# Patient Record
Sex: MALE | Race: WHITE | NOT HISPANIC OR LATINO | URBAN - METROPOLITAN AREA
[De-identification: names, ages, dates, MRNs, and addresses within clinical notes are randomized per-mention and may not be internally consistent; named-entity substitution may affect disease eponyms.]

---

## 2017-03-10 ENCOUNTER — IMPORTED ENCOUNTER (OUTPATIENT)
Dept: URBAN - METROPOLITAN AREA CLINIC 1 | Facility: CLINIC | Age: 57
End: 2017-03-10

## 2017-03-10 PROBLEM — Z79.4: Noted: 2017-03-10

## 2017-03-10 PROBLEM — E11.9: Noted: 2017-03-10

## 2017-03-10 PROBLEM — H25.813: Noted: 2017-03-10

## 2017-03-10 PROBLEM — E11.3293: Noted: 2017-03-10

## 2017-03-10 PROBLEM — H04.123: Noted: 2017-03-10

## 2017-03-10 PROCEDURE — 92015 DETERMINE REFRACTIVE STATE: CPT

## 2017-03-10 PROCEDURE — 92014 COMPRE OPH EXAM EST PT 1/>: CPT

## 2017-03-10 NOTE — PATIENT DISCUSSION
1.  DM Type II without sign of diabetic retinopathy and no blot heme on dilated retinal examination today OU No Macular Edema:  Discussed the pathophysiology of diabetes and its effect on the eye and risk of blindness. Stressed the importance of strong glucose control. Advised of importance of at least yearly dilated examinations but to contact us immediately for any problems or concerns. 2. Type II Insulin dependent diabetes mellitus3. Cataract OU: Observe for now without intervention. The patient was advised to contact us if any change or worsening of vision4. Dry Eyes OU -- Recommended to patient to use Artificial Tears BID OU5. Return for an appointment in 6 months for 10 DFE. with Dr. Marlene Villasenor.

## 2017-09-15 ENCOUNTER — IMPORTED ENCOUNTER (OUTPATIENT)
Dept: URBAN - METROPOLITAN AREA CLINIC 1 | Facility: CLINIC | Age: 57
End: 2017-09-15

## 2017-09-15 PROBLEM — Z79.4: Noted: 2017-09-15

## 2017-09-15 PROBLEM — E11.3293: Noted: 2017-09-15

## 2017-09-15 PROBLEM — H43.811: Noted: 2017-09-15

## 2017-09-15 PROBLEM — H25.813: Noted: 2017-09-15

## 2017-09-15 PROBLEM — H16.143: Noted: 2017-09-15

## 2017-09-15 PROBLEM — H04.123: Noted: 2017-09-15

## 2017-09-15 PROCEDURE — 92012 INTRM OPH EXAM EST PATIENT: CPT

## 2017-09-15 NOTE — PATIENT DISCUSSION
1.  DM Type II (on Insulin) with Mild Nonproliferative Diabetic Retinopathy OU No Macular Edema:  Discussed the pathophysiology of diabetes and its effect on the eye and risk of blindness. Stressed the importance of strong glucose control. Advised of importance of at least yearly dilated examinations but to contact us immediately for any problems or concerns. 2. Cataract OU: Observe for now without intervention. The patient was advised to contact us if any change or worsening of vision3. JOHNY w/ PEK OU- Cont ATs TID OU Routinely. 4.  PVD w/o Tear OD - RD precautions. Return for an appointment in 6 mo 30 with Dr. Kristian Calabrese.

## 2018-03-08 ENCOUNTER — IMPORTED ENCOUNTER (OUTPATIENT)
Dept: URBAN - METROPOLITAN AREA CLINIC 1 | Facility: CLINIC | Age: 58
End: 2018-03-08

## 2018-03-08 PROBLEM — H25.813: Noted: 2018-03-08

## 2018-03-08 PROBLEM — E11.3293: Noted: 2018-03-08

## 2018-03-08 PROBLEM — H04.123: Noted: 2018-03-08

## 2018-03-08 PROBLEM — H16.143: Noted: 2018-03-08

## 2018-03-08 PROBLEM — Z79.4: Noted: 2018-03-08

## 2018-03-08 PROBLEM — H43.811: Noted: 2018-03-08

## 2018-03-08 PROCEDURE — 92014 COMPRE OPH EXAM EST PT 1/>: CPT

## 2018-03-08 NOTE — PATIENT DISCUSSION
1.  DM Type II with Mild Nonproliferative Diabetic Retinopathy OU No Macular Edema: Stable OD. Slight progression OS. Discussed the pathophysiology of diabetes and its effect on the eye and risk of blindness. Stressed the importance of strong glucose control. Advised of importance of at least yearly dilated examinations but to contact us immediately for any problems or concerns. 2. Type II Insulin dependent diabetes mellitus3. Cataract OU: Observe for now without intervention. The patient was advised to contact us if any change or worsening of vision4. JOHNY w/ PEK OU- Controlled. The continuation of artificial tears were recommended. 5.  PVD w/o Tear OD- Old stable. 6.  Return for an appointment for a 40 in 6 months and a 27 in 1 year with Dr. Roxie Del Cid.

## 2018-09-11 ENCOUNTER — IMPORTED ENCOUNTER (OUTPATIENT)
Dept: URBAN - METROPOLITAN AREA CLINIC 1 | Facility: CLINIC | Age: 58
End: 2018-09-11

## 2018-09-11 PROBLEM — H52.13: Noted: 2018-09-11

## 2018-09-11 PROBLEM — H52.4: Noted: 2018-09-11

## 2018-09-11 PROCEDURE — S0621 ROUTINE OPHTHALMOLOGICAL EXA: HCPCS

## 2018-09-11 NOTE — PATIENT DISCUSSION
1. Myopia OU -- Finalized Glasses MRx was given to patient today for correction if indicated and requested2. Presbyopia OU 3.  DM Type II (Insulin Dependent) -- H/o with Mild Nonproliferative Diabetic Retinopathy OU but h/o no Macular Edema OU. 4. Cataracts OU -- Observe5. JOHNY w/ PEK OU -- Recommend the frequent use of OTC AT's BID-QID OU. 6. PVD w/o Tear OD Finalized CTL Rx & given to patient today. Return for an appointment in 1 YR for a 36 / CC OU with Dr. Mel Tee. Return as scheduled in March 2019 for 30 appointment with Dr. Mark Contreras.  Patient has been self treating w/ MonoVA: OD CTL- Distance VA / OS CTL (leaves out)- Intermediate / Near [place]

## 2019-03-08 ENCOUNTER — IMPORTED ENCOUNTER (OUTPATIENT)
Dept: URBAN - METROPOLITAN AREA CLINIC 1 | Facility: CLINIC | Age: 59
End: 2019-03-08

## 2019-03-08 PROBLEM — H04.123: Noted: 2019-03-08

## 2019-03-08 PROBLEM — H25.813: Noted: 2019-03-08

## 2019-03-08 PROBLEM — H43.811: Noted: 2019-03-08

## 2019-03-08 PROBLEM — E11.3293: Noted: 2019-03-08

## 2019-03-08 PROBLEM — Z79.4: Noted: 2019-03-08

## 2019-03-08 PROBLEM — H16.143: Noted: 2019-03-08

## 2019-03-08 PROCEDURE — 92014 COMPRE OPH EXAM EST PT 1/>: CPT

## 2019-03-08 NOTE — PATIENT DISCUSSION
1.  DM Type II (Insulin) with Mild Nonproliferative Diabetic Retinopathy OU No Macular Edema:  Discussed the pathophysiology of diabetes and its effect on the eye and risk of blindness. Stressed the importance of strong glucose control. Advised of importance of at least yearly dilated examinations but to contact us immediately for any problems or concerns. 2. JOHNY w/ PEK OU- Recommend ATs TID OU routinely 3. Cataract OU: Observe for now without intervention. The patient was advised to contact us if any change or worsening of vision4. PVD w/o Tear OD - RD precautions. 5.  H/o CTL Wear - Monovision Return for an appointment in 1 year 27 with Dr. Luan Hernandez. Return for an appointment for Return as scheduled 40/cc with Dr. Luan Hernandez.

## 2019-09-16 ENCOUNTER — IMPORTED ENCOUNTER (OUTPATIENT)
Dept: URBAN - METROPOLITAN AREA CLINIC 1 | Facility: CLINIC | Age: 59
End: 2019-09-16

## 2019-09-16 PROBLEM — H52.13: Noted: 2019-09-16

## 2019-09-16 PROCEDURE — S0621 ROUTINE OPHTHALMOLOGICAL EXA: HCPCS

## 2019-09-16 NOTE — PATIENT DISCUSSION
1. Myopia -- Rx was given for correction if indicated and requested. 2. Cataract OU -- Observe for now without intervention. The patient was advised to contact us if any change or worsening of vision3. JOHNY w/ PEK OU -- Continue ATs TID OU routinely. 4. CTL Wear -- Monovision Patient defers CTL exam today. 5. PVD w/o Tear OD. 6.  H/o DM Type II (Insulin) with Mild Nonproliferative Diabetic Retinopathy OU No Macular EdemaReturn for an appointment in 1 year 36 with Dr. Jyoti Garcia. Return as scheduled 30 with Dr. Skylar Fan.

## 2020-03-13 ENCOUNTER — IMPORTED ENCOUNTER (OUTPATIENT)
Dept: URBAN - METROPOLITAN AREA CLINIC 1 | Facility: CLINIC | Age: 60
End: 2020-03-13

## 2020-03-13 PROBLEM — H43.811: Noted: 2020-03-13

## 2020-03-13 PROBLEM — H16.143: Noted: 2020-03-13

## 2020-03-13 PROBLEM — H04.123: Noted: 2020-03-13

## 2020-03-13 PROBLEM — Z79.84: Noted: 2020-03-13

## 2020-03-13 PROBLEM — E11.9: Noted: 2020-03-13

## 2020-03-13 PROBLEM — H25.813: Noted: 2020-03-13

## 2020-03-13 PROBLEM — Z79.4: Noted: 2020-03-13

## 2020-03-13 PROCEDURE — 92014 COMPRE OPH EXAM EST PT 1/>: CPT

## 2020-03-13 PROCEDURE — 92015 DETERMINE REFRACTIVE STATE: CPT

## 2020-03-13 NOTE — PATIENT DISCUSSION
1.  DM Type II (Insulin) with Mild Nonproliferative Diabetic Retinopathy OU No Macular Edema:  Discussed the pathophysiology of diabetes and its effect on the eye and risk of blindness. Stressed the importance of strong glucose control. Advised of importance of at least yearly dilated examinations but to contact us immediately for any problems or concerns. 2. JOHNY w/ PEK OU- Recommend ATs TID OU routinely 3. Cataract OU - Observe for now without intervention. The patient was advised to contact us if any change or worsening of vision4. PVD w/o Tear OD - RD precautions. 5.  H/o CTL Wear - Monovision Deferred MRx today. Letter to PCP. Return for an appointment in 1 year 27 with Dr. Rome Dumont.

## 2020-09-18 ENCOUNTER — IMPORTED ENCOUNTER (OUTPATIENT)
Dept: URBAN - METROPOLITAN AREA CLINIC 1 | Facility: CLINIC | Age: 60
End: 2020-09-18

## 2020-09-18 PROBLEM — H52.4: Noted: 2020-09-18

## 2020-09-18 PROBLEM — H52.13: Noted: 2020-09-18

## 2020-09-18 PROCEDURE — S0621 ROUTINE OPHTHALMOLOGICAL EXA: HCPCS

## 2020-09-18 NOTE — PATIENT DISCUSSION
1. Myopia/ Presbyopia -- Rx was given for correction if indicated and requested. 2. Cataract OU -- Observe for now without intervention. The patient was advised to contact us if any change or worsening of vision3. JOHNY w/ PEK OU -- Continue ATs TID OU routinely. 4. CTL Wear -- Monovision Patient defers CTL exam today. 5. PVD w/o Tear OD. 6.  H/o DM Type II (Insulin) with Mild Nonproliferative Diabetic Retinopathy OU No Macular EdemaReturn for an appointment in 1 year 36 with Dr. Sandra Cardona.

## 2021-03-09 ENCOUNTER — IMPORTED ENCOUNTER (OUTPATIENT)
Dept: URBAN - METROPOLITAN AREA CLINIC 1 | Facility: CLINIC | Age: 61
End: 2021-03-09

## 2021-03-09 PROBLEM — H04.123: Noted: 2021-03-09

## 2021-03-09 PROBLEM — H25.813: Noted: 2021-03-09

## 2021-03-09 PROBLEM — H16.143: Noted: 2021-03-09

## 2021-03-09 PROBLEM — Z79.4: Noted: 2021-03-09

## 2021-03-09 PROBLEM — E11.3293: Noted: 2021-03-09

## 2021-03-09 PROCEDURE — 99214 OFFICE O/P EST MOD 30 MIN: CPT

## 2021-03-09 NOTE — PATIENT DISCUSSION
1.  DM Type II (Insulin) with Mild Nonproliferative Diabetic Retinopathy OU No Macular Edema:  Discussed the pathophysiology of diabetes and its effect on the eye and risk of blindness. Stressed the importance of strong glucose control. Advised of importance of at least yearly dilated examinations but to contact us immediately for any problems or concerns. 2. JOHNY w/ PEK OU - Cont ATs TID OU routinely 3. Cataract OU - Observe for now without intervention. The patient was advised to contact us if any change or worsening of vision4. PVD w/o Tear OD - RD precautions. 5.  H/o CTL Wear - Monovision Deferred MRx today. Letter to PCP. Return for an appointment in 1 year 27 with Dr. Rosibel Godinez. Return for an appointment in As scheduled for a 40 with Dr. Elvi Weinstein.

## 2021-09-24 ENCOUNTER — IMPORTED ENCOUNTER (OUTPATIENT)
Dept: URBAN - METROPOLITAN AREA CLINIC 1 | Facility: CLINIC | Age: 61
End: 2021-09-24

## 2021-09-24 PROBLEM — H52.13: Noted: 2021-09-24

## 2021-09-24 PROBLEM — H52.4: Noted: 2021-09-24

## 2021-09-24 PROBLEM — H52.223: Noted: 2021-09-24

## 2021-09-24 PROCEDURE — S0621 ROUTINE OPHTHALMOLOGICAL EXA: HCPCS

## 2021-09-24 NOTE — PATIENT DISCUSSION
1. Myopia w/ Astigmatism OU -- Rx was given for correction if indicated and requested. 2. Presbyopia 3. Cataract OU -- Observe. 4.  JOHNY w/ PEK OU -- Cont ATs TID OU routinely. 5.  PVD w/o Tear OD -- Stable. RD Precautions. 6.  H/o DM Type II (Insulin) w/ Mild NPDR w/o ME OU 7. [place] CTL wear -- Pt deferred CTL fit today. Return for an appointment as scheduled ([phone] - 06) with Dr. Rafa Abraham. Return for an appointment in 1 year 40/cc with Dr. Rissa Lomeli.

## 2022-03-18 ENCOUNTER — COMPREHENSIVE EXAM (OUTPATIENT)
Dept: URBAN - METROPOLITAN AREA CLINIC 1 | Facility: CLINIC | Age: 62
End: 2022-03-18

## 2022-03-18 DIAGNOSIS — H16.143: ICD-10-CM

## 2022-03-18 DIAGNOSIS — E11.3293: ICD-10-CM

## 2022-03-18 DIAGNOSIS — H43.813: ICD-10-CM

## 2022-03-18 DIAGNOSIS — H25.813: ICD-10-CM

## 2022-03-18 DIAGNOSIS — H04.123: ICD-10-CM

## 2022-03-18 DIAGNOSIS — Z79.4: ICD-10-CM

## 2022-03-18 PROCEDURE — 92014 COMPRE OPH EXAM EST PT 1/>: CPT

## 2022-03-18 ASSESSMENT — VISUAL ACUITY
OD_CC: J1
OS_CC: 20/20
OS_CC: J1
OD_BAT: 20/50
OD_CC: 20/20-2
OS_BAT: 20/50

## 2022-03-18 ASSESSMENT — TONOMETRY
OD_IOP_MMHG: 17
OS_IOP_MMHG: 17

## 2022-03-18 NOTE — PATIENT DISCUSSION
(W/o Macular Edema) DM Type II with Mild Nonproliferative Diabetic Retinopathy OU, No Macular Edema:  Discussed the pathophysiology of diabetes and its effect on the eye and risk of blindness. Stressed the importance of strong glucose control. Advised the importance of at least yearly dilated examinations, but to contact us immediately for any problems or concerns.

## 2022-04-02 ASSESSMENT — KERATOMETRY
OS_AXISANGLE2_DEGREES: 086
OS_K1POWER_DIOPTERS: 42.50
OS_K2POWER_DIOPTERS: 42.00
OS_AXISANGLE_DEGREES: 176
OD_K1POWER_DIOPTERS: 42.75
OD_K2POWER_DIOPTERS: 41.75
OD_AXISANGLE2_DEGREES: 092
OD_AXISANGLE_DEGREES: 002

## 2022-04-02 ASSESSMENT — VISUAL ACUITY
OS_SC: 20/20-1
OS_CC: J1
OS_SC: 20/20
OS_SC: 20/20
OS_SC: 20/25
OS_GLARE: 20/50
OD_SC: 20/20-1
OD_CC: J1
OS_SC: 20/20
OD_GLARE: 20/60
OD_SC: 20/20
OD_SC: 20/20
OS_SC: 20/25
OD_SC: 20/20
OD_SC: 20/20-1
OS_SC: 20/20
OS_CC: J1
OD_CC: J1+
OS_GLARE: 20/50
OD_CC: J1
OS_GLARE: 20/50
OS_SC: 20/20
OD_GLARE: 20/50
OD_GLARE: 20/50
OS_SC: 20/20
OD_CC: 20/40
OS_CC: J1+
OD_SC: 20/20
OD_SC: 20/20
OS_CC: J1
OS_CC: 20/30
OD_CC: J1
OS_CC: J1+
OS_CC: J1
OS_SC: 20/20
OD_CC: J1+
OD_GLARE: 20/50
OD_SC: 20/20
OS_CC: J1
OS_GLARE: 20/50
OD_CC: J1
OS_CC: J1
OD_CC: J1
OD_CC: J1
OD_SC: 20/20
OD_SC: 20/20

## 2022-04-02 ASSESSMENT — TONOMETRY
OD_IOP_MMHG: 21
OS_IOP_MMHG: 19
OD_IOP_MMHG: 18
OS_IOP_MMHG: 20
OS_IOP_MMHG: 18
OS_IOP_MMHG: 20
OD_IOP_MMHG: 20
OD_IOP_MMHG: 13
OD_IOP_MMHG: 20
OD_IOP_MMHG: 18
OS_IOP_MMHG: 19
OD_IOP_MMHG: 20
OS_IOP_MMHG: 17
OD_IOP_MMHG: 18
OS_IOP_MMHG: 13
OS_IOP_MMHG: 25
OD_IOP_MMHG: 18
OS_IOP_MMHG: 18

## 2022-10-26 ENCOUNTER — COMPREHENSIVE EXAM (OUTPATIENT)
Dept: URBAN - METROPOLITAN AREA CLINIC 1 | Facility: CLINIC | Age: 62
End: 2022-10-26

## 2022-10-26 DIAGNOSIS — Z01.00: ICD-10-CM

## 2022-10-26 PROCEDURE — 92015 DETERMINE REFRACTIVE STATE: CPT

## 2022-10-26 PROCEDURE — 92014 COMPRE OPH EXAM EST PT 1/>: CPT

## 2022-10-26 ASSESSMENT — TONOMETRY
OD_IOP_MMHG: 17
OS_IOP_MMHG: 18

## 2022-10-26 ASSESSMENT — VISUAL ACUITY
OS_CC: J1+
OD_CC: J1+
OD_CC: 20/20
OS_CC: 20/20-1

## 2023-11-02 ENCOUNTER — COMPREHENSIVE EXAM (OUTPATIENT)
Dept: URBAN - METROPOLITAN AREA CLINIC 1 | Facility: CLINIC | Age: 63
End: 2023-11-02

## 2023-11-02 DIAGNOSIS — H52.223: ICD-10-CM

## 2023-11-02 DIAGNOSIS — H52.13: ICD-10-CM

## 2023-11-02 DIAGNOSIS — Z01.00: ICD-10-CM

## 2023-11-02 PROCEDURE — 92014 COMPRE OPH EXAM EST PT 1/>: CPT

## 2023-11-02 PROCEDURE — 92015 DETERMINE REFRACTIVE STATE: CPT

## 2023-11-02 ASSESSMENT — TONOMETRY
OD_IOP_MMHG: 20
OS_IOP_MMHG: 18

## 2023-11-02 ASSESSMENT — VISUAL ACUITY
OS_CC: J1
OS_CC: 20/25
OD_CC: 20/20
OD_CC: J1

## 2024-05-14 ENCOUNTER — COMPREHENSIVE EXAM (OUTPATIENT)
Dept: URBAN - METROPOLITAN AREA CLINIC 1 | Facility: CLINIC | Age: 64
End: 2024-05-14

## 2024-05-14 DIAGNOSIS — Z79.4: ICD-10-CM

## 2024-05-14 DIAGNOSIS — H43.813: ICD-10-CM

## 2024-05-14 DIAGNOSIS — H16.143: ICD-10-CM

## 2024-05-14 DIAGNOSIS — E11.3293: ICD-10-CM

## 2024-05-14 DIAGNOSIS — H25.813: ICD-10-CM

## 2024-05-14 DIAGNOSIS — H04.123: ICD-10-CM

## 2024-05-14 PROCEDURE — 92014 COMPRE OPH EXAM EST PT 1/>: CPT

## 2024-05-14 ASSESSMENT — VISUAL ACUITY
OS_CC: 20/20
OD_CC: 20/20

## 2024-05-14 ASSESSMENT — TONOMETRY
OD_IOP_MMHG: 20
OS_IOP_MMHG: 20

## 2024-11-08 ENCOUNTER — COMPREHENSIVE EXAM (OUTPATIENT)
Dept: URBAN - METROPOLITAN AREA CLINIC 1 | Facility: CLINIC | Age: 64
End: 2024-11-08

## 2024-11-08 DIAGNOSIS — Z01.00: ICD-10-CM

## 2024-11-08 DIAGNOSIS — H52.4: ICD-10-CM

## 2024-11-08 DIAGNOSIS — H52.223: ICD-10-CM

## 2024-11-08 DIAGNOSIS — H52.13: ICD-10-CM

## 2024-11-08 PROCEDURE — 92014 COMPRE OPH EXAM EST PT 1/>: CPT

## 2024-11-08 PROCEDURE — 92015 DETERMINE REFRACTIVE STATE: CPT

## 2025-05-09 ENCOUNTER — COMPREHENSIVE EXAM (OUTPATIENT)
Age: 65
End: 2025-05-09

## 2025-05-09 DIAGNOSIS — H52.223: ICD-10-CM

## 2025-05-09 DIAGNOSIS — Z79.4: ICD-10-CM

## 2025-05-09 DIAGNOSIS — H04.123: ICD-10-CM

## 2025-05-09 DIAGNOSIS — H43.813: ICD-10-CM

## 2025-05-09 DIAGNOSIS — H25.813: ICD-10-CM

## 2025-05-09 DIAGNOSIS — E11.3293: ICD-10-CM

## 2025-05-09 DIAGNOSIS — H16.143: ICD-10-CM

## 2025-05-09 PROCEDURE — 92015 DETERMINE REFRACTIVE STATE: CPT

## 2025-05-09 PROCEDURE — 99214 OFFICE O/P EST MOD 30 MIN: CPT
